# Patient Record
Sex: MALE | Race: WHITE | Employment: FULL TIME | URBAN - METROPOLITAN AREA
[De-identification: names, ages, dates, MRNs, and addresses within clinical notes are randomized per-mention and may not be internally consistent; named-entity substitution may affect disease eponyms.]

---
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
## 2018-02-16 PROBLEM — R79.89 ELEVATED LFTS: Status: ACTIVE | Noted: 2018-02-16

## 2018-02-16 PROBLEM — E55.9 VITAMIN D DEFICIENCY: Status: ACTIVE | Noted: 2018-02-16

## 2019-07-30 PROCEDURE — 84681 ASSAY OF C-PEPTIDE: CPT | Performed by: INTERNAL MEDICINE

## 2019-07-30 PROCEDURE — 83525 ASSAY OF INSULIN: CPT | Performed by: INTERNAL MEDICINE

## 2019-07-30 PROCEDURE — 36415 COLL VENOUS BLD VENIPUNCTURE: CPT | Performed by: INTERNAL MEDICINE

## 2019-07-30 PROCEDURE — 86337 INSULIN ANTIBODIES: CPT | Performed by: INTERNAL MEDICINE

## 2019-07-30 PROCEDURE — 86341 ISLET CELL ANTIBODY: CPT | Performed by: INTERNAL MEDICINE

## 2019-07-30 PROCEDURE — 83516 IMMUNOASSAY NONANTIBODY: CPT | Performed by: INTERNAL MEDICINE

## 2019-08-05 PROBLEM — E11.65 UNCONTROLLED TYPE 2 DIABETES MELLITUS WITH HYPERGLYCEMIA (HCC): Status: ACTIVE | Noted: 2019-08-05

## 2019-12-19 PROBLEM — H69.91 EUSTACHIAN TUBE DYSFUNCTION, RIGHT: Status: ACTIVE | Noted: 2019-12-19

## 2019-12-19 PROBLEM — H93.291 ABNORMAL AUDITORY PERCEPTION OF RIGHT EAR: Status: ACTIVE | Noted: 2019-12-19

## 2019-12-19 PROBLEM — H69.81 EUSTACHIAN TUBE DYSFUNCTION, RIGHT: Status: ACTIVE | Noted: 2019-12-19

## 2020-07-10 PROBLEM — R80.9 MICROALBUMINURIA: Status: ACTIVE | Noted: 2020-07-10

## 2020-12-15 PROBLEM — E11.9 CONTROLLED TYPE 2 DIABETES MELLITUS WITHOUT COMPLICATION, WITHOUT LONG-TERM CURRENT USE OF INSULIN (HCC): Status: ACTIVE | Noted: 2020-12-15

## 2021-08-26 ENCOUNTER — HOSPITAL ENCOUNTER (OUTPATIENT)
Age: 21
Discharge: HOME | End: 2021-08-26
Payer: COMMERCIAL

## 2021-08-26 VITALS
DIASTOLIC BLOOD PRESSURE: 66 MMHG | TEMPERATURE: 99 F | SYSTOLIC BLOOD PRESSURE: 124 MMHG | BODY MASS INDEX: 27.77 KG/M2 | RESPIRATION RATE: 18 BRPM | HEIGHT: 72 IN | HEART RATE: 78 BPM | WEIGHT: 205 LBS

## 2021-08-26 DIAGNOSIS — R06.02 SOB (SHORTNESS OF BREATH): Primary | ICD-10-CM

## 2021-08-26 DIAGNOSIS — B34.9 VIRAL SYNDROME: ICD-10-CM

## 2021-08-26 LAB — SARS-COV-2 RNA RESP QL NAA+PROBE: NOT DETECTED

## 2021-08-26 PROCEDURE — 99203 OFFICE O/P NEW LOW 30 MIN: CPT | Performed by: PHYSICIAN ASSISTANT

## 2021-08-26 PROCEDURE — U0002 COVID-19 LAB TEST NON-CDC: HCPCS | Performed by: PHYSICIAN ASSISTANT

## 2021-08-27 NOTE — ED PROVIDER NOTES
Patient Seen in: [facility]      History   Patient presents with:  Difficulty Breathing: Shortness of breath, nose is burning, chest is burning, congestion - Entered by patient    Stated Complaint: Difficulty Breathing - Shortness of breath, n landmarks. Normal nasal mucosa without audible nasal congestion. Oropharynx is patent without evidence of erythema, exudates or deviation.   No stridor to auscultation  Lung: No distress, RR, no retraction, breath sounds are clear bilaterally  Cardio: Regu

## 2022-02-15 PROBLEM — E11.69 HYPERLIPIDEMIA ASSOCIATED WITH TYPE 2 DIABETES MELLITUS: Status: ACTIVE | Noted: 2022-02-15

## 2022-02-15 PROBLEM — E11.69 HYPERLIPIDEMIA ASSOCIATED WITH TYPE 2 DIABETES MELLITUS (HCC): Status: ACTIVE | Noted: 2022-02-15

## 2022-02-15 PROBLEM — E78.5 HYPERLIPIDEMIA ASSOCIATED WITH TYPE 2 DIABETES MELLITUS: Status: ACTIVE | Noted: 2022-02-15

## 2022-02-15 PROBLEM — E11.65 UNCONTROLLED TYPE 2 DIABETES MELLITUS WITH HYPERGLYCEMIA (HCC): Status: RESOLVED | Noted: 2019-08-05 | Resolved: 2022-02-15

## 2022-02-15 PROBLEM — E78.5 HYPERLIPIDEMIA ASSOCIATED WITH TYPE 2 DIABETES MELLITUS (HCC): Status: ACTIVE | Noted: 2022-02-15

## 2023-11-19 ENCOUNTER — APPOINTMENT (OUTPATIENT)
Dept: GENERAL RADIOLOGY | Age: 23
End: 2023-11-19
Attending: NURSE PRACTITIONER
Payer: COMMERCIAL

## 2023-11-19 ENCOUNTER — HOSPITAL ENCOUNTER (OUTPATIENT)
Age: 23
Discharge: HOME | End: 2023-11-19
Payer: COMMERCIAL

## 2023-11-19 VITALS
SYSTOLIC BLOOD PRESSURE: 141 MMHG | RESPIRATION RATE: 16 BRPM | HEIGHT: 72 IN | WEIGHT: 240 LBS | DIASTOLIC BLOOD PRESSURE: 81 MMHG | BODY MASS INDEX: 32.51 KG/M2 | HEART RATE: 102 BPM | OXYGEN SATURATION: 99 % | TEMPERATURE: 97 F

## 2023-11-19 DIAGNOSIS — R05.1 ACUTE COUGH: Primary | ICD-10-CM

## 2023-11-19 PROCEDURE — 71046 X-RAY EXAM CHEST 2 VIEWS: CPT | Performed by: NURSE PRACTITIONER

## 2023-11-19 PROCEDURE — 99203 OFFICE O/P NEW LOW 30 MIN: CPT | Performed by: NURSE PRACTITIONER

## 2023-11-19 RX ORDER — BENZONATATE 100 MG/1
100 CAPSULE ORAL 3 TIMES DAILY PRN
Qty: 30 CAPSULE | Refills: 0 | Status: SHIPPED | OUTPATIENT
Start: 2023-11-19 | End: 2023-12-19

## 2023-11-19 RX ORDER — PREDNISONE 20 MG/1
20 TABLET ORAL 2 TIMES DAILY
Qty: 10 TABLET | Refills: 0 | Status: SHIPPED | OUTPATIENT
Start: 2023-11-19 | End: 2023-11-24

## 2023-11-19 RX ORDER — ALBUTEROL SULFATE 90 UG/1
2 AEROSOL, METERED RESPIRATORY (INHALATION) EVERY 4 HOURS PRN
Qty: 1 EACH | Refills: 0 | Status: SHIPPED | OUTPATIENT
Start: 2023-11-19 | End: 2023-12-19

## 2023-11-19 NOTE — ED INITIAL ASSESSMENT (HPI)
Cough for a week, states last night it got worse. Took some robitussin.  No chills or body aches, some sinus congestion

## 2023-11-19 NOTE — DISCHARGE INSTRUCTIONS
Follow-up with your primary care physician for all of your healthcare needs  Increase fluids keep well-hydrated  Tylenol and ibuprofen for fevers and pain  Steroids twice a day for 5 days  In use inhaler as needed  Tessalon Perles for the cough  Return to the emergency room if your symptoms or concerns

## 2024-08-31 ENCOUNTER — HOSPITAL ENCOUNTER (OUTPATIENT)
Age: 24
Discharge: HOME | End: 2024-08-31
Payer: COMMERCIAL

## 2024-08-31 VITALS
TEMPERATURE: 97 F | HEIGHT: 72 IN | WEIGHT: 270 LBS | BODY MASS INDEX: 36.57 KG/M2 | DIASTOLIC BLOOD PRESSURE: 70 MMHG | HEART RATE: 100 BPM | OXYGEN SATURATION: 98 % | RESPIRATION RATE: 18 BRPM | SYSTOLIC BLOOD PRESSURE: 124 MMHG

## 2024-08-31 DIAGNOSIS — H65.193 ACUTE EFFUSION OF BOTH MIDDLE EARS: ICD-10-CM

## 2024-08-31 DIAGNOSIS — H65.02 ACUTE SEROUS OTITIS MEDIA OF LEFT EAR, RECURRENCE NOT SPECIFIED: ICD-10-CM

## 2024-08-31 DIAGNOSIS — J06.9 VIRAL URI WITH COUGH: Primary | ICD-10-CM

## 2024-08-31 LAB — SARS-COV-2 RNA RESP QL NAA+PROBE: NOT DETECTED

## 2024-08-31 RX ORDER — FLUTICASONE PROPIONATE 50 MCG
2 SPRAY, SUSPENSION (ML) NASAL DAILY
Qty: 1 EACH | Refills: 0 | Status: SHIPPED | OUTPATIENT
Start: 2024-08-31 | End: 2025-08-26

## 2024-08-31 RX ORDER — AMOXICILLIN 875 MG
875 TABLET ORAL 2 TIMES DAILY
Qty: 14 TABLET | Refills: 0 | Status: SHIPPED | OUTPATIENT
Start: 2024-08-31 | End: 2024-09-07

## 2024-08-31 NOTE — ED PROVIDER NOTES
Patient Seen in: [facility]      History     Chief Complaint   Patient presents with    Nasal Congestion    Ear Pain     Stated Complaint: Congestion, ear pain    Subjective:   HPI  Patient is a pleasant 26-year-old male with DM and seasonal allergies here for evaluation of 2-day history of nasal congestion, cough and bilateral ear itching.  Patient states nasal congestion has \"thick and yellow.\"  Has been taking Dayquil/nyquil with moderate, temporary relief.  Patient teaches second grade.  Cough is a tickle cough    No NVD, fever, chills, body aches, shortness of breath, pain with inspiration or chest pain.    Objective:   Past Medical History:    Anxiety    Asthma (HCC)    Exercise induced asthma    Depression    Seasonal allergies    Uncontrolled type 2 diabetes mellitus with hyperglycemia (HCC)              Past Surgical History:   Procedure Laterality Date    Carpal tunnel release                  Social History     Socioeconomic History    Marital status: Single   Tobacco Use    Smoking status: Never    Smokeless tobacco: Never   Substance and Sexual Activity    Alcohol use: Yes     Comment: social    Drug use: No              Review of Systems    Positive for stated Chief Complaint: Nasal Congestion and Ear Pain    Other systems are as noted in HPI.  Constitutional and vital signs reviewed.      All other systems reviewed and negative except as noted above.    Physical Exam     ED Triage Vitals [08/31/24 0824]   /70   Pulse 100   Resp 18   Temp 96.6 °F (35.9 °C)   Temp src Temporal   SpO2 98 %   O2 Device None (Room air)       Current Vitals:   Vital Signs  BP: 124/70  Pulse: 100  Resp: 18  Temp: 96.6 °F (35.9 °C)  Temp src: Temporal    Oxygen Therapy  SpO2: 98 %  O2 Device: None (Room air)            Physical Exam  Vitals and nursing note reviewed.   Constitutional:       General: He is not in acute distress.     Appearance: Normal appearance. He is not ill-appearing, toxic-appearing or  diaphoretic.   HENT:      Right Ear: A middle ear effusion is present. Tympanic membrane is injected. Tympanic membrane is not bulging.      Left Ear: A middle ear effusion is present. Tympanic membrane is injected. Tympanic membrane is not bulging.      Nose: Congestion present.      Right Turbinates: Swollen.      Left Turbinates: Swollen.      Mouth/Throat:      Mouth: Mucous membranes are moist.      Pharynx: Oropharynx is clear. Posterior oropharyngeal erythema present. No oropharyngeal exudate.   Eyes:      Extraocular Movements: Extraocular movements intact.      Conjunctiva/sclera: Conjunctivae normal.      Pupils: Pupils are equal, round, and reactive to light.   Cardiovascular:      Rate and Rhythm: Normal rate and regular rhythm.      Heart sounds: Normal heart sounds.   Pulmonary:      Effort: Pulmonary effort is normal.      Breath sounds: Normal breath sounds.   Neurological:      Mental Status: He is alert.             ED Course     Labs Reviewed   RAPID SARS-COV-2 BY PCR - Normal                 MDM                                 Medical Decision Making  Differentials include but are not limited to viral URI with cough, ear effusion, and AOM.  There is noted bilateral clear effusion.  There is no obvious bacterial focus noted on exam.  Negative rapid COVID testing here today.  Discussed with patient antibiotic is not indicated at this time.  Will plan for supportive treatment including continuing DayQuil/NyQuil, encouraged starting daily allergy regimen with Flonase, antihistamine and Benadryl at bedtime.  Warm packs to the face, steam showers, humidified air and increase fluids.  Instructions reviewed in detail and attached with AVS.  Printed conditional prescription for amoxicillin if ear pain evolves.  Patient agrees with plan of care.  All questions answered to patient's satisfaction.        Disposition and Plan     Clinical Impression:  1. Viral URI with cough    2. Acute effusion of both  middle ears    3. Acute serous otitis media of left ear, recurrence not specified         Disposition:  Discharge  8/31/2024  9:00 am    Follow-up:  Gemma Barber MD  [address]  [address] [place] [address]  [phone]      As needed          Medications Prescribed:  Discharge Medication List as of 8/31/2024  9:02 AM        START taking these medications    Details   fluticasone propionate 50 MCG/ACT Nasal Suspension 2 sprays by Each Nare route daily., Normal, Disp-1 each, R-0      amoxicillin 875 MG Oral Tab Take 1 tablet (875 mg total) by mouth 2 (two) times daily for 7 days., Print, Disp-14 tablet, R-0

## 2024-08-31 NOTE — DISCHARGE INSTRUCTIONS
Start daily allergy regimen with daily antihistamine and Flonase twice daily.  Continue DayQuil/NyQuil as needed.  May also take additional dose of antihistamine (Benadryl) at bedtime.  Warm packs to the face, steam showers humidified air and increase fluids.    If symptoms continue over 7 days, facial pain or ear pain develop, start antibiotic.

## 2024-10-23 ENCOUNTER — HOSPITAL ENCOUNTER (EMERGENCY)
Age: 24
Discharge: HOME | End: 2024-10-23
Attending: EMERGENCY MEDICINE
Payer: COMMERCIAL

## 2024-10-23 ENCOUNTER — APPOINTMENT (OUTPATIENT)
Dept: GENERAL RADIOLOGY | Age: 24
End: 2024-10-23
Attending: EMERGENCY MEDICINE
Payer: COMMERCIAL

## 2024-10-23 VITALS
SYSTOLIC BLOOD PRESSURE: 124 MMHG | HEIGHT: 72 IN | OXYGEN SATURATION: 97 % | DIASTOLIC BLOOD PRESSURE: 54 MMHG | BODY MASS INDEX: 36.57 KG/M2 | WEIGHT: 270 LBS | HEART RATE: 97 BPM | TEMPERATURE: 99 F | RESPIRATION RATE: 18 BRPM

## 2024-10-23 DIAGNOSIS — R07.89 CHEST PAIN, ATYPICAL: Primary | ICD-10-CM

## 2024-10-23 DIAGNOSIS — F41.9 ANXIETY: ICD-10-CM

## 2024-10-23 LAB
ALBUMIN SERPL-MCNC: 4 G/DL (ref 3.4–5)
ALBUMIN/GLOB SERPL: 1.3 {RATIO} (ref 1–2)
ALP LIVER SERPL-CCNC: 55 U/L
ALT SERPL-CCNC: 81 U/L
ANION GAP SERPL CALC-SCNC: 6 MMOL/L (ref 0–18)
AST SERPL-CCNC: 21 U/L (ref 15–37)
BASOPHILS # BLD AUTO: 0.03 X10(3) UL (ref 0–0.2)
BASOPHILS NFR BLD AUTO: 0.5 %
BILIRUB SERPL-MCNC: 0.5 MG/DL (ref 0.1–2)
BUN BLD-MCNC: 11 MG/DL (ref 9–23)
CALCIUM BLD-MCNC: 9.3 MG/DL (ref 8.5–10.1)
CHLORIDE SERPL-SCNC: 104 MMOL/L (ref 98–112)
CO2 SERPL-SCNC: 26 MMOL/L (ref 21–32)
CREAT BLD-MCNC: 0.9 MG/DL
D DIMER PPP FEU-MCNC: <0.27 UG/ML FEU (ref ?–0.5)
EGFRCR SERPLBLD CKD-EPI 2021: 121 ML/MIN/1.73M2 (ref 60–?)
EOSINOPHIL # BLD AUTO: 0.14 X10(3) UL (ref 0–0.7)
EOSINOPHIL NFR BLD AUTO: 2.3 %
ERYTHROCYTE [DISTWIDTH] IN BLOOD BY AUTOMATED COUNT: 13.6 %
GLOBULIN PLAS-MCNC: 3.1 G/DL (ref 2.8–4.4)
GLUCOSE BLD-MCNC: 144 MG/DL (ref 70–99)
HCT VFR BLD AUTO: 39.9 %
HGB BLD-MCNC: 14.5 G/DL
IMM GRANULOCYTES # BLD AUTO: 0.05 X10(3) UL (ref 0–1)
IMM GRANULOCYTES NFR BLD: 0.8 %
LYMPHOCYTES # BLD AUTO: 1.42 X10(3) UL (ref 1–4)
LYMPHOCYTES NFR BLD AUTO: 23.5 %
MCH RBC QN AUTO: 31.3 PG (ref 26–34)
MCHC RBC AUTO-ENTMCNC: 36.3 G/DL (ref 31–37)
MCV RBC AUTO: 86 FL
MONOCYTES # BLD AUTO: 0.27 X10(3) UL (ref 0.1–1)
MONOCYTES NFR BLD AUTO: 4.5 %
NEUTROPHILS # BLD AUTO: 4.14 X10 (3) UL (ref 1.5–7.7)
NEUTROPHILS # BLD AUTO: 4.14 X10(3) UL (ref 1.5–7.7)
NEUTROPHILS NFR BLD AUTO: 68.4 %
OSMOLALITY SERPL CALC.SUM OF ELEC: 284 MOSM/KG (ref 275–295)
PLATELET # BLD AUTO: 182 10(3)UL (ref 150–450)
POTASSIUM SERPL-SCNC: 3.4 MMOL/L (ref 3.5–5.1)
PROT SERPL-MCNC: 7.1 G/DL (ref 6.4–8.2)
RBC # BLD AUTO: 4.64 X10(6)UL
SODIUM SERPL-SCNC: 136 MMOL/L (ref 136–145)
TROPONIN I SERPL HS-MCNC: 4 NG/L
WBC # BLD AUTO: 6.1 X10(3) UL (ref 4–11)

## 2024-10-23 PROCEDURE — 93010 ELECTROCARDIOGRAM REPORT: CPT

## 2024-10-23 PROCEDURE — 85379 FIBRIN DEGRADATION QUANT: CPT | Performed by: EMERGENCY MEDICINE

## 2024-10-23 PROCEDURE — 99285 EMERGENCY DEPT VISIT HI MDM: CPT

## 2024-10-23 PROCEDURE — 71045 X-RAY EXAM CHEST 1 VIEW: CPT | Performed by: EMERGENCY MEDICINE

## 2024-10-23 PROCEDURE — 84484 ASSAY OF TROPONIN QUANT: CPT | Performed by: EMERGENCY MEDICINE

## 2024-10-23 PROCEDURE — 99284 EMERGENCY DEPT VISIT MOD MDM: CPT

## 2024-10-23 PROCEDURE — 36415 COLL VENOUS BLD VENIPUNCTURE: CPT

## 2024-10-23 PROCEDURE — 80053 COMPREHEN METABOLIC PANEL: CPT | Performed by: EMERGENCY MEDICINE

## 2024-10-23 PROCEDURE — 85025 COMPLETE CBC W/AUTO DIFF WBC: CPT | Performed by: EMERGENCY MEDICINE

## 2024-10-23 PROCEDURE — 93005 ELECTROCARDIOGRAM TRACING: CPT

## 2024-10-23 RX ORDER — ALPRAZOLAM 0.25 MG/1
0.25 TABLET ORAL 4 TIMES DAILY PRN
Qty: 20 TABLET | Refills: 0 | Status: SHIPPED | OUTPATIENT
Start: 2024-10-23 | End: 2024-10-30

## 2024-10-23 RX ORDER — LAMOTRIGINE 100 MG/1
100 TABLET ORAL NIGHTLY
COMMUNITY

## 2024-10-24 LAB
ATRIAL RATE: 101 BPM
P AXIS: 52 DEGREES
P-R INTERVAL: 144 MS
Q-T INTERVAL: 324 MS
QRS DURATION: 86 MS
QTC CALCULATION (BEZET): 420 MS
R AXIS: 14 DEGREES
T AXIS: 16 DEGREES
VENTRICULAR RATE: 101 BPM

## 2024-10-24 NOTE — ED INITIAL ASSESSMENT (HPI)
C/O chest pain that radiates down L arm. On and off x 2 days. Denies sob, light headed or dizziness, n/v. States has been feeling very anxious.

## 2024-10-24 NOTE — ED PROVIDER NOTES
Patient Seen in: [facility] [place]      History     Chief Complaint   Patient presents with    Chest Pain Angina     Stated Complaint: Chest pain that radiated down left arm    Subjective:   HPI      Patient is a 24-year-old male presenting for evaluation of intermittent left-sided chest pain, radiating to the neck and left anterior shoulder.  Pain has been going on intermittently for the last 2 days, they are more often than not.  He reports he has a history of anxiety and has been very stressed at his job recently.  Symptoms are definitely worse when he is at work.  Sometimes feels short of breath with it.    Objective:     Past Medical History:    Anxiety    Asthma (HCC)    Exercise induced asthma    Depression    Diabetes (HCC)    Seasonal allergies    Uncontrolled type 2 diabetes mellitus with hyperglycemia (HCC)              Past Surgical History:   Procedure Laterality Date    Carpal tunnel release                  Social History     Socioeconomic History    Marital status: Single   Tobacco Use    Smoking status: Never    Smokeless tobacco: Never   Vaping Use    Vaping status: Never Used   Substance and Sexual Activity    Alcohol use: Yes     Comment: social    Drug use: No                  Physical Exam     ED Triage Vitals   BP 10/23/24 1928 146/86   Pulse 10/23/24 1926 103   Resp 10/23/24 1926 16   Temp 10/23/24 1926 98.9 °F (37.2 °C)   Temp src 10/23/24 1926 Oral   SpO2 10/23/24 1926 98 %   O2 Device 10/23/24 1926 None (Room air)       Current Vitals:   Vital Signs  BP: 136/75  Pulse: 93  Resp: 17  Temp: 98.9 °F (37.2 °C)  Temp src: Oral    Oxygen Therapy  SpO2: 99 %  O2 Device: None (Room air)        Physical Exam  Vitals and nursing note reviewed.   Constitutional:       Appearance: He is well-developed.   HENT:      Head: Normocephalic and atraumatic.   Eyes:      Conjunctiva/sclera: Conjunctivae normal.      Pupils: Pupils are equal, round, and reactive to light.    Cardiovascular:      Rate and Rhythm: Normal rate and regular rhythm.      Heart sounds: Normal heart sounds.   Pulmonary:      Effort: Pulmonary effort is normal.      Breath sounds: Normal breath sounds.   Abdominal:      General: Bowel sounds are normal.      Palpations: Abdomen is soft.   Musculoskeletal:         General: Normal range of motion.      Cervical back: Normal range of motion and neck supple.   Skin:     General: Skin is warm and dry.   Neurological:      Mental Status: He is alert and oriented to person, place, and time.             ED Course     Labs Reviewed   COMP METABOLIC PANEL (14) - Abnormal; Notable for the following components:       Result Value    Glucose 144 (*)     Potassium 3.4 (*)     ALT 81 (*)     All other components within normal limits   TROPONIN I HIGH SENSITIVITY - Normal   D-DIMER - Normal   CBC WITH DIFFERENTIAL WITH PLATELET     EKG    Rate, intervals and axes as noted on EKG Report.  Rate: 101  Rhythm: Sinus Rhythm  Reading: Sinus tachycardia.  No ST segment or T wave changes.  No old available for comparison.               Heart Score:    HEART Score      Title      Criteria Score   Age: <45 Age Score: 0   History: Slightly Suspicious Hx Score: 0     EKG: Normal EKG Score: 0   HTN: No   Hypercholesterolemia: Yes   Atherosclerosis/PVD: No     DM: Yes   BMI>30kg/m2: Yes   Smoking: No   Family History: No         Other Risk Factor Score: 3             Lab Results   Component Value Date    TROPHS 4 10/23/2024           HEART Score: 2        Risk of adverse cardiac event is 0.9-1.7%                XR CHEST AP PORTABLE  (CPT=71045)    Result Date: 10/23/2024            PROCEDURE:  XR CHEST AP PORTABLE  (CPT=71045)  TECHNIQUE:  AP chest radiograph was obtained.  COMPARISON:  [facility], XR, XR CHEST PA + LAT CHEST (CPT=71046), 11/19/2023, 11:10 AM.  INDICATIONS:  Chest pain that radiated down left arm  PATIENT STATED HISTORY: (As transcribed by Technologist)   Patient has chest pains that radiated down left arm for 2 days.   FINDINGS: Cardiac silhouette and pulmonary vasculature are unremarkable. No consolidation, pleural effusion or pneumothorax. IMPRESSION: Unremarkable portable chest radiograph.   LOCATION:  [facility]      Dictated by (CST): Ramírez Lee MD on 10/23/2024 at 8:39 PM     Finalized by (CST): Ramírez Lee MD on 10/23/2024 at 8:39 PM             MDM      24-year-old male with a history of anxiety presenting for evaluation of intermittent left-sided chest pain for the last 2 days.  There more often than not.  He associates it with being much worse when he is at work and anxious.  He does have a history of diabetes but overall is quite low risk for premature CAD and I do think this is most likely anxiety related.  Labs ordered including troponin and a D-dimer to rule out ACS, PE and will get appropriate imaging based on lab results.      Updated 8:55 PM.  Workup is unremarkable.  Negative troponin, D-dimer, chest x-ray.  This is not cardiac and overall is consistent with stress and anxiety.  On intake questioning patient did answer yes to the question of wishing that sometimes he went to sleep and did not wake up.  However this is not every night and he is does not have any active SI.  He is low risk.  Was offered telemedicine evaluation here at [place] but appears he is third or fourth line for evaluation and he and his parents were made aware that it would likely be several hours until he was evaluated.  The other options provided were to be discharged from here and go to [facility] for an evaluation tonight, versus outpatient referrals and follow-up either with his regular psychiatrist or one of the ones provided.  He is comfortable going home and will follow-up as an outpatient.  He has parents are requesting a short course of anxiolytics for home and I think it is reasonable to give him a few Xanax to use as needed.  They are comfortable  with the plan.        Past Medical History-  anxiety    Differential diagnosis before testing included ACS, PE, pneumothorax, anxiety    Co-morbidities that add to the complexity of management include: Anxiety    Testing ordered during this visit included labs, chest x-ray    Radiographic images  I personally reviewed the radiographs and my individual interpretation shows no infiltrate or pneumothorax  I also reviewed the official reports that showed no infiltrate or pneumothorax            Disposition:          Discharge  I have discussed with the patient the results of test, differential diagnosis, treatment plan, warning signs and symptoms which should prompt immediate return.  They expressed understanding of these instructions and agrees to the following plan provided.  They were given written discharge instructions and agrees to return for any concerns and voiced understanding and all questions were answered.      Medical Decision Making      Disposition and Plan     Clinical Impression:  1. Chest pain, atypical    2. Anxiety         Disposition:  Discharge  10/23/2024  9:09 pm    Follow-up:  Gemma Barber MD  [address] [facility]  [address] [place] [id number]  [phone]    Follow up            Medications Prescribed:  Current Discharge Medication List        START taking these medications    Details   ALPRAZolam 0.25 MG Oral Tab Take 1 tablet (0.25 mg total) by mouth 4 (four) times daily as needed for Anxiety.  Qty: 20 tablet, Refills: 0    Associated Diagnoses: Anxiety                 Supplementary Documentation:

## 2024-10-24 NOTE — DISCHARGE INSTRUCTIONS
Alprazolam as needed for anxiety.  It can make you drowsy so do not work or drive when taking.  Follow-up per referrals or with your psychiatrist.    Outpatient therapy referrals:  Tiff Napoles [facility] at [facility], [address], [place], call Divine Gordillo at [phone] to schedule an appointment  Randall Urban [facility] at [facility], [address], [place], call Sheila Liang at [phone] to schedule an appointment  Clare Schroeder [facility]PC at [facility], [address], [place], call Nadira Walker at [phone] to schedule an appointment    Outpatient psychiatry referrals:  NEGRITA Gomez at [facility], [address] [facility], [facility] [address], [place], call Annabelle Alva at [phone] to schedule an appointment  EDWIN Elmore at [facility], [address], [place], call Nadira Walker at [phone] to schedule an appointment  DOMO Bush at [facility], [address] [facility], [place], call Juani Ley at [phone] to schedule an appointment

## 2024-11-18 ENCOUNTER — LAB ENCOUNTER (OUTPATIENT)
Dept: LAB | Age: 24
End: 2024-11-18
Attending: Other
Payer: COMMERCIAL

## 2024-11-18 DIAGNOSIS — R53.83 FATIGUE DUE TO DEPRESSION: ICD-10-CM

## 2024-11-18 DIAGNOSIS — F32.A FATIGUE DUE TO DEPRESSION: ICD-10-CM

## 2024-11-18 LAB
T4 FREE SERPL-MCNC: 1.1 NG/DL (ref 0.8–1.7)
TSI SER-ACNC: 2.13 UIU/ML (ref 0.55–4.78)

## 2024-11-18 PROCEDURE — 84439 ASSAY OF FREE THYROXINE: CPT

## 2024-11-18 PROCEDURE — 36415 COLL VENOUS BLD VENIPUNCTURE: CPT

## 2024-11-18 PROCEDURE — 84443 ASSAY THYROID STIM HORMONE: CPT

## 2024-12-11 PROBLEM — F42.2 MIXED OBSESSIONAL THOUGHTS AND ACTS: Status: ACTIVE | Noted: 2024-12-11

## 2025-02-13 ENCOUNTER — HOSPITAL ENCOUNTER (OUTPATIENT)
Age: 25
Discharge: HOME | End: 2025-02-13
Payer: COMMERCIAL

## 2025-02-13 VITALS
RESPIRATION RATE: 20 BRPM | TEMPERATURE: 98 F | SYSTOLIC BLOOD PRESSURE: 138 MMHG | OXYGEN SATURATION: 94 % | HEART RATE: 105 BPM | DIASTOLIC BLOOD PRESSURE: 86 MMHG

## 2025-02-13 DIAGNOSIS — J01.00 ACUTE NON-RECURRENT MAXILLARY SINUSITIS: Primary | ICD-10-CM

## 2025-02-13 PROCEDURE — 99214 OFFICE O/P EST MOD 30 MIN: CPT | Performed by: NURSE PRACTITIONER

## 2025-02-13 NOTE — ED PROVIDER NOTES
Patient Seen in: [facility]      History     Chief Complaint   Patient presents with    Nasal Congestion    Cough    Body ache and/or chills    Ear Pain     Stated Complaint: congestion; chest tightness; ear pain    Subjective:   HPI      24-year-old male with history of anxiety, asthma, depression, diabetes presents today with complaints of cough and congestion and ear pain for several days.  Patient denies any known COVID or flu exposure.    Objective:     Past Medical History:    Anxiety    Asthma (HCC)    Exercise induced asthma    Depression    Diabetes (HCC)    Seasonal allergies    Uncontrolled type 2 diabetes mellitus with hyperglycemia (HCC)              Past Surgical History:   Procedure Laterality Date    Carpal tunnel release                  Social History     Socioeconomic History    Marital status: Single   Tobacco Use    Smoking status: Never    Smokeless tobacco: Never   Vaping Use    Vaping status: Never Used   Substance and Sexual Activity    Alcohol use: Yes     Comment: social    Drug use: No     Social Drivers of Health     Food Insecurity: No Food Insecurity (10/25/2024)    Food Insecurity     Food Insecurity: Never true   Transportation Needs: No Transportation Needs (10/25/2024)    Transportation Needs     Lack of Transportation: No   Housing Stability: Low Risk  (10/25/2024)    Housing Stability     Housing Instability: No              Review of Systems   Constitutional: Negative.    HENT:  Positive for congestion, ear pain, sinus pressure and sinus pain.    Eyes: Negative.    Respiratory: Negative.     Cardiovascular: Negative.    Gastrointestinal: Negative.    Endocrine: Negative.    Genitourinary: Negative.    Musculoskeletal: Negative.    Skin: Negative.    Allergic/Immunologic: Negative.    Neurological: Negative.    Hematological: Negative.    Psychiatric/Behavioral: Negative.         Positive for stated complaint: congestion; chest tightness; ear pain  Other systems are  as noted in HPI.  Constitutional and vital signs reviewed.      All other systems reviewed and negative except as noted above.    Physical Exam     ED Triage Vitals [02/13/25 1104]   /86   Pulse 105   Resp 20   Temp 97.9 °F (36.6 °C)   Temp src Oral   SpO2 94 %   O2 Device None (Room air)       Current Vitals:   Vital Signs  BP: 138/86  Pulse: 105  Resp: 20  Temp: 97.9 °F (36.6 °C)  Temp src: Oral    Oxygen Therapy  SpO2: 94 %  O2 Device: None (Room air)        Physical Exam  Vitals and nursing note reviewed.   Constitutional:       Appearance: Normal appearance.   HENT:      Head: Normocephalic.      Right Ear: Ear canal and external ear normal.      Left Ear: Ear canal and external ear normal.      Ears:      Comments: Bilateral TM red and injected.     Nose: Congestion and rhinorrhea present.      Comments: Bilateral turbinates injected and swollen.     Mouth/Throat:      Mouth: Mucous membranes are moist.      Pharynx: Oropharynx is clear.   Eyes:      Extraocular Movements: Extraocular movements intact.      Conjunctiva/sclera: Conjunctivae normal.      Pupils: Pupils are equal, round, and reactive to light.   Cardiovascular:      Rate and Rhythm: Normal rate and regular rhythm.      Pulses: Normal pulses.      Heart sounds: Normal heart sounds.   Pulmonary:      Effort: Pulmonary effort is normal.      Breath sounds: Normal breath sounds.   Musculoskeletal:      Cervical back: Normal range of motion and neck supple.   Skin:     General: Skin is warm.   Neurological:      Mental Status: He is alert.   Psychiatric:         Mood and Affect: Mood normal.           ED Course   Labs Reviewed - No data to display                MDM      24-year-old male with history of anxiety, asthma, depression, diabetes presents today with complaints of cough and congestion and ear pain for several days.  Patient denies any known COVID or flu exposure.  Vital signs: Please see EMR.  Physical exam: Please see  exam.  Differential diagnosis: Sinusitis, otitis media, viral illness.  Based on physical exam and HPI will diagnosed with sinusitis and treat with Augmentin.  Patient instructed to increase his water intake.  ED precautions given.        Medical Decision Making  24-year-old male with history of anxiety, asthma, depression, diabetes presents today with complaints of cough and congestion and ear pain for several days.  Patient denies any known COVID or flu exposure.    Problems Addressed:  Acute non-recurrent maxillary sinusitis: acute illness or injury    Amount and/or Complexity of Data Reviewed  Labs: ordered. Decision-making details documented in ED Course.    Risk  OTC drugs.  Prescription drug management.        Disposition and Plan     Clinical Impression:  1. Acute non-recurrent maxillary sinusitis         Disposition:  Discharge  2/13/2025 11:23 am    Follow-up:  Gemma Barber MD  [address] [address]  [phone]    In 1 week            Medications Prescribed:  Current Discharge Medication List        START taking these medications    Details   amoxicillin clavulanate 875-125 MG Oral Tab Take 1 tablet by mouth 2 (two) times daily for 7 days.  Qty: 14 tablet, Refills: 0                 Supplementary Documentation:

## 2025-02-17 PROBLEM — F33.2 MDD (MAJOR DEPRESSIVE DISORDER), RECURRENT EPISODE, SEVERE (HCC): Status: ACTIVE | Noted: 2025-02-17

## (undated) NOTE — LETTER
Date & Time: 2/13/2025, 11:23 AM  Patient: Dionisio Deras  Encounter Provider(s):    Zeenat Barney APRN       To Whom It May Concern:    Dionisio Deras was seen and treated in our department on 2/13/2025. He can return to work.    If you have any questions or concerns, please do not hesitate to call.        _____________________________  Physician/APC Signature

## (undated) NOTE — LETTER
[facility] [place]  [address] [address]  Dept: [phone]  Dept Fax: [phone]         January 10, 2017    Patient: Patricia Houser   YOB: 2000   Date of Visit: 1/10/2017       To Whom It May Concern:

## (undated) NOTE — ED AVS SNAPSHOT
[facility] in [address] [address]    Phone:  [phone]    Fax:  [phone]           Yanet Neel   MRN: [id number]    Department:  [facility] in [facility]   Date of Visit:  1/10/2017           NUZHAT may not be covered by your plan. Please contact your insurance company to determine coverage for follow-up care and referrals. [facility]  [address] [facility], [address] [place] [address]  [phone] [address]  [address] prescription right away and begin taking the medication(s) as directed. If the Immediate Care Provider has read X-rays, these will be re-interpreted by a radiologist.  If there is a significant change in your reading, you will be contacted.  Please make Medicaid plans. To get signed up and covered, please call [phone] and ask to get set up for an insurance coverage that is in-network with [facility]. Marguerite     Sign up for [email or web address], your secure online medical record.   [employer]

## (undated) NOTE — LETTER
[facility] IN [place]  [address] [address]  Dept: [phone]  Dept Fax: [phone]         August 18, 2017    Patient: Ledy Gravabiodun   YOB: 2000   Date of Visit: 8/18/2017       To Whom It May Concern: